# Patient Record
Sex: MALE | Race: WHITE | Employment: FULL TIME | URBAN - NONMETROPOLITAN AREA
[De-identification: names, ages, dates, MRNs, and addresses within clinical notes are randomized per-mention and may not be internally consistent; named-entity substitution may affect disease eponyms.]

---

## 2020-07-08 ENCOUNTER — OFFICE VISIT (OUTPATIENT)
Dept: FAMILY MEDICINE CLINIC | Age: 55
End: 2020-07-08
Payer: COMMERCIAL

## 2020-07-08 VITALS
SYSTOLIC BLOOD PRESSURE: 136 MMHG | WEIGHT: 234.4 LBS | DIASTOLIC BLOOD PRESSURE: 84 MMHG | BODY MASS INDEX: 33.56 KG/M2 | HEART RATE: 78 BPM | HEIGHT: 70 IN | TEMPERATURE: 97.9 F

## 2020-07-08 PROBLEM — N52.8 OTHER MALE ERECTILE DYSFUNCTION: Status: ACTIVE | Noted: 2020-07-08

## 2020-07-08 PROBLEM — I10 ESSENTIAL HYPERTENSION: Status: ACTIVE | Noted: 2020-07-08

## 2020-07-08 PROBLEM — E11.9 TYPE 2 DIABETES MELLITUS WITHOUT COMPLICATION, WITHOUT LONG-TERM CURRENT USE OF INSULIN (HCC): Status: ACTIVE | Noted: 2020-07-08

## 2020-07-08 PROBLEM — R97.20 ELEVATED PSA: Status: ACTIVE | Noted: 2020-07-08

## 2020-07-08 PROBLEM — F41.9 ANXIETY: Status: ACTIVE | Noted: 2020-07-08

## 2020-07-08 PROBLEM — E78.49 OTHER HYPERLIPIDEMIA: Status: ACTIVE | Noted: 2020-07-08

## 2020-07-08 PROBLEM — H61.23 BILATERAL IMPACTED CERUMEN: Status: ACTIVE | Noted: 2020-07-08

## 2020-07-08 PROCEDURE — G0296 VISIT TO DETERM LDCT ELIG: HCPCS | Performed by: FAMILY MEDICINE

## 2020-07-08 PROCEDURE — 99204 OFFICE O/P NEW MOD 45 MIN: CPT | Performed by: FAMILY MEDICINE

## 2020-07-08 RX ORDER — BUPROPION HYDROCHLORIDE 300 MG/1
300 TABLET ORAL EVERY MORNING
Qty: 90 TABLET | Refills: 1 | Status: SHIPPED | OUTPATIENT
Start: 2020-07-08 | End: 2021-01-11 | Stop reason: SDUPTHER

## 2020-07-08 RX ORDER — PITAVASTATIN CALCIUM 4.18 MG/1
4 TABLET, FILM COATED ORAL DAILY
COMMUNITY
End: 2020-07-08 | Stop reason: SDUPTHER

## 2020-07-08 RX ORDER — BUPROPION HYDROCHLORIDE 300 MG/1
300 TABLET ORAL EVERY MORNING
COMMUNITY
End: 2020-07-08 | Stop reason: SDUPTHER

## 2020-07-08 RX ORDER — LISINOPRIL 5 MG/1
5 TABLET ORAL DAILY
Qty: 90 TABLET | Refills: 1 | Status: SHIPPED | OUTPATIENT
Start: 2020-07-08 | End: 2021-01-11 | Stop reason: SDUPTHER

## 2020-07-08 RX ORDER — SILDENAFIL CITRATE 20 MG/1
60 TABLET ORAL DAILY PRN
Qty: 20 TABLET | Refills: 5 | Status: SHIPPED | OUTPATIENT
Start: 2020-07-08 | End: 2021-01-11 | Stop reason: SDUPTHER

## 2020-07-08 RX ORDER — LISINOPRIL 5 MG/1
5 TABLET ORAL DAILY
COMMUNITY
End: 2020-07-08 | Stop reason: SDUPTHER

## 2020-07-08 RX ORDER — PITAVASTATIN CALCIUM 4.18 MG/1
4 TABLET, FILM COATED ORAL DAILY
Qty: 90 TABLET | Refills: 1 | Status: SHIPPED | OUTPATIENT
Start: 2020-07-08 | End: 2021-01-11 | Stop reason: SDUPTHER

## 2020-07-08 RX ORDER — TADALAFIL 10 MG/1
10 TABLET ORAL PRN
Refills: 0 | Status: CANCELLED | OUTPATIENT
Start: 2020-07-08

## 2020-07-08 SDOH — ECONOMIC STABILITY: FOOD INSECURITY: WITHIN THE PAST 12 MONTHS, THE FOOD YOU BOUGHT JUST DIDN'T LAST AND YOU DIDN'T HAVE MONEY TO GET MORE.: NEVER TRUE

## 2020-07-08 SDOH — ECONOMIC STABILITY: FOOD INSECURITY: WITHIN THE PAST 12 MONTHS, YOU WORRIED THAT YOUR FOOD WOULD RUN OUT BEFORE YOU GOT MONEY TO BUY MORE.: NEVER TRUE

## 2020-07-08 SDOH — ECONOMIC STABILITY: TRANSPORTATION INSECURITY
IN THE PAST 12 MONTHS, HAS LACK OF TRANSPORTATION KEPT YOU FROM MEETINGS, WORK, OR FROM GETTING THINGS NEEDED FOR DAILY LIVING?: NO

## 2020-07-08 SDOH — HEALTH STABILITY: MENTAL HEALTH: HOW OFTEN DO YOU HAVE A DRINK CONTAINING ALCOHOL?: NEVER

## 2020-07-08 SDOH — ECONOMIC STABILITY: INCOME INSECURITY: HOW HARD IS IT FOR YOU TO PAY FOR THE VERY BASICS LIKE FOOD, HOUSING, MEDICAL CARE, AND HEATING?: NOT HARD AT ALL

## 2020-07-08 SDOH — ECONOMIC STABILITY: TRANSPORTATION INSECURITY
IN THE PAST 12 MONTHS, HAS THE LACK OF TRANSPORTATION KEPT YOU FROM MEDICAL APPOINTMENTS OR FROM GETTING MEDICATIONS?: NO

## 2020-07-08 ASSESSMENT — ENCOUNTER SYMPTOMS
BLOOD IN STOOL: 0
WHEEZING: 0
SORE THROAT: 0
SHORTNESS OF BREATH: 0
RHINORRHEA: 0
BACK PAIN: 0
BLURRED VISION: 0
VISUAL CHANGE: 0
ABDOMINAL PAIN: 0

## 2020-07-08 ASSESSMENT — PATIENT HEALTH QUESTIONNAIRE - PHQ9
SUM OF ALL RESPONSES TO PHQ9 QUESTIONS 1 & 2: 0
SUM OF ALL RESPONSES TO PHQ QUESTIONS 1-9: 0
1. LITTLE INTEREST OR PLEASURE IN DOING THINGS: 0
2. FEELING DOWN, DEPRESSED OR HOPELESS: 0
SUM OF ALL RESPONSES TO PHQ QUESTIONS 1-9: 0

## 2020-07-08 NOTE — PROGRESS NOTES
[facility] PROFESSIONAL [facility]  [address] [facility]  Dept: [phone]  Dept Fax: [phone]  Loc: [phone]  PROGRESS NOTE      New Patient    Visit Date: 7/8/2020    Chaparro Tanner is a 62 y.o. male who presents today for:  Chief Complaint   Patient presents with    New Patient       Subjective:  Diabetes   He presents for his follow-up diabetic visit. He has type 2 diabetes mellitus. His disease course has been stable. There are no hypoglycemic associated symptoms. Pertinent negatives for hypoglycemia include no dizziness or nervousness/anxiousness. Pertinent negatives for diabetes include no blurred vision, no chest pain, no foot paresthesias, no visual change and no weight loss. There are no hypoglycemic complications. Symptoms are stable. Pertinent negatives for diabetic complications include no CVA, heart disease, nephropathy or peripheral neuropathy. Risk factors for coronary artery disease include dyslipidemia, diabetes mellitus, male sex, obesity, tobacco exposure, sedentary lifestyle and hypertension. Current diabetic treatment includes oral agent (monotherapy). He is compliant with treatment all of the time. His weight is stable. He is following a diabetic diet. An ACE inhibitor/angiotensin II receptor blocker is being taken. Type 2 diabetes: On metformin. He does not check glucose levels. He is also on an ACE inhibitor and statin. HTN:  On linsopril. No hx of CAD    Hyperlipidemia: He is on Livalo. No myalgias. anxiety: On Wellbutrin 300 mg daily. Mood is good. Anxiety is controlled. Sleeping well. He reports his job is stressful    History of elevated PSA. Wakes 2-3 times per night to urinate. Bump on scalp that he attributes to his sinus problem. He was previously seen in [facility] for his primary care. He is due for labs.     Works as a  in [employer]    Review of Systems   Constitutional: Negative route daily       No current facility-administered medications for this visit. Allergies   Allergen Reactions    Penicillins Hives     Health Maintenance   Topic Date Due    Hepatitis C screen  1965    HIV screen  08/03/1978    DTaP/Tdap/Td vaccine (1 - Tdap) 08/03/1982    Lipid screen  08/03/2003    Shingles Vaccine (1 of 2) 08/03/2013    Colon cancer screen colonoscopy  08/03/2013    Low dose CT lung screening  08/03/2018    Flu vaccine (1) 09/01/2020    Hepatitis A vaccine  Aged Out    Hepatitis B vaccine  Aged Out    Hib vaccine  Aged Out    Meningococcal (ACWY) vaccine  Aged Out    Pneumococcal 0-64 years Vaccine  Aged Out       Objective:  /84 (Site: Left Upper Arm, Position: Sitting, Cuff Size: Large Adult)   Pulse 78   Temp 97.9 °F (36.6 °C) (Temporal)   Ht 5' 10\" (1.778 m)   Wt 234 lb 6.4 oz (106.3 kg)   BMI 33.63 kg/m²   Physical Exam  Vitals signs reviewed. Constitutional:       General: He is not in acute distress. Appearance: He is well-developed. He is not ill-appearing. HENT:      Head:        Right Ear: There is impacted cerumen. Left Ear: There is impacted cerumen. Mouth/Throat:      Mouth: Mucous membranes are moist.      Pharynx: No oropharyngeal exudate or posterior oropharyngeal erythema. Eyes:      General: No scleral icterus. Right eye: No discharge. Left eye: No discharge. Conjunctiva/sclera: Conjunctivae normal.   Cardiovascular:      Rate and Rhythm: Normal rate and regular rhythm. Heart sounds: No murmur. Pulmonary:      Effort: Pulmonary effort is normal. No respiratory distress. Breath sounds: Normal breath sounds. No wheezing. Abdominal:      General: There is no distension. Palpations: Abdomen is soft. Tenderness: There is no abdominal tenderness. Musculoskeletal:      Right lower leg: No edema. Left lower leg: No edema.    Neurological:      Mental Status: He is alert and oriented to person, place, and time. Mental status is at baseline. Gait: Gait normal.   Psychiatric:         Mood and Affect: Mood normal.         Behavior: Behavior normal.         Visual inspection:  Deformity/amputation: absent  Skin lesions/pre-ulcerative calluses: absent  Edema: right- negative, left- negative    Sensory exam:  Monofilament sensation: normal  (minimum of 5 random plantar locations tested, avoiding callused areas - > 1 area with absence of sensation is + for neuropathy)    Plus at least one of the following:  Pulses: normal,     Impression/Plan:  1. Type 2 diabetes mellitus without complication, without long-term current use of insulin (HCC)  Chronic. Check status of control with A1c. Continue metformin. Check labs. Recommend starting aspirin 81 mg daily for cardiac prevention  - metFORMIN (GLUCOPHAGE) 500 MG tablet; Take 1 tablet by mouth 2 times daily (with meals)  Dispense: 180 tablet; Refill: 1  - Comprehensive Metabolic Panel; Future  - CBC; Future  - Hemoglobin A1C; Future  - HM DIABETES FOOT EXAM    2. Essential hypertension  Chronic. Well-controlled. Continue/refill lisinopril. Check labs. - lisinopril (PRINIVIL;ZESTRIL) 5 MG tablet; Take 1 tablet by mouth daily  Dispense: 90 tablet; Refill: 1  - Comprehensive Metabolic Panel; Future  - CBC; Future    3. Other hyperlipidemia  Chronic. Check status of control with lipid panel. Refill med  - pitavastatin (LIVALO) 4 MG TABS tablet; Take 1 tablet by mouth daily  Dispense: 90 tablet; Refill: 1  - Lipid Panel; Future    4. Other male erectile dysfunction  Chronic. Controlled with Revatio. Refill med  - sildenafil (REVATIO) 20 MG tablet; Take 3 tablets by mouth daily as needed (erectile dysfunction)  Dispense: 20 tablet; Refill: 5    5. Anxiety  Well-controlled. Chronic condition. Refill medication(s). - buPROPion (WELLBUTRIN XL) 300 MG extended release tablet; Take 1 tablet by mouth every morning  Dispense: 90 tablet;  Refill: 1    6. Elevated PSA  Chronic. PSA 1.95 in February 2019. He has not had a recheck since then. Recheck PSA. Discussed his urinary symptoms that he has at nighttime. He declines Flomax for suspected BPH.  - PSA Prostatic Specific Antigen; Future    7. Bilateral impacted cerumen  New problem. He declines irrigation today. Recommend Debrox    8. Personal history of tobacco use  - NV VISIT TO DISCUSS LUNG CA SCREEN W LDCT  - CT Lung Screen (Annual); Future      Diet and exercise    They voiced understanding. All questions answered. They agreed with treatment plan. See patient instructions for any educational materials that may have been given. Discussed use, benefit, and side effects of prescribed medications. Reviewed health maintenance. (Please note that portions of this note may have been completed with a voice recognition program.  Efforts were made to edit the dictation but occasionally words are mis-transcribed.)    Return in about 6 months (around 1/8/2021) for HTN, DM. Electronically signed by Simone Jasso MD on 7/8/2020 at 8:18 AM     Low Dose CT (LDCT) Lung Screening criteria met   Age 50-69   Pack year smoking >30   Still smoking or less than 15 year since quit   No sign or symptoms of lung cancer   > 11 months since last LDCT     Risks and benefits of lung cancer screening with LDCT scans discussed:    Significance of positive screen - False-positive LDCT results often occur. 95% of all positive results do not lead to a diagnosis of cancer. Usually further imaging can resolve most false-positive results; however, some patients may require invasive procedures. Over diagnosis risk - 10% to 12% of screen-detected lung cancer cases are over diagnosed--that is, the cancer would not have been detected in the patient's lifetime without the screening.     Need for follow up screens annually to continue lung cancer screening effectiveness     Risks associated with radiation from annual

## 2020-10-09 ENCOUNTER — TELEPHONE (OUTPATIENT)
Dept: FAMILY MEDICINE CLINIC | Age: 55
End: 2020-10-09

## 2020-10-09 NOTE — TELEPHONE ENCOUNTER
Approval through Cover My Meds for Livalo.  [facility] notified of approval. Message left for patient notifying him of approval.

## 2020-10-09 NOTE — TELEPHONE ENCOUNTER
Called [facility] and spoke with Maximus Landa. The Lisinopril is covered by his insurance and does not require anything further to be done. They have it ready for him to . Maximus Landa then stated that the Livolo 4 mg for cholesterol is not covered. She gave me information so I can attempt PA through Cover My Meds.

## 2021-01-06 ENCOUNTER — TELEPHONE (OUTPATIENT)
Dept: FAMILY MEDICINE CLINIC | Age: 56
End: 2021-01-06

## 2021-01-06 NOTE — TELEPHONE ENCOUNTER
Call to patient to reschedule appointment on 1/7/2020 to virtual or another day. Patient will to reschedule appointment to Monday 1/11/2021 at [address] [place] [address]. No questions or concerns voiced.

## 2021-01-11 ENCOUNTER — OFFICE VISIT (OUTPATIENT)
Dept: FAMILY MEDICINE CLINIC | Age: 56
End: 2021-01-11
Payer: COMMERCIAL

## 2021-01-11 VITALS
WEIGHT: 222 LBS | HEIGHT: 70 IN | OXYGEN SATURATION: 98 % | TEMPERATURE: 98.3 F | HEART RATE: 82 BPM | SYSTOLIC BLOOD PRESSURE: 136 MMHG | BODY MASS INDEX: 31.78 KG/M2 | DIASTOLIC BLOOD PRESSURE: 84 MMHG

## 2021-01-11 DIAGNOSIS — I10 ESSENTIAL HYPERTENSION: ICD-10-CM

## 2021-01-11 DIAGNOSIS — F41.9 ANXIETY: ICD-10-CM

## 2021-01-11 DIAGNOSIS — E78.49 OTHER HYPERLIPIDEMIA: ICD-10-CM

## 2021-01-11 DIAGNOSIS — E11.9 TYPE 2 DIABETES MELLITUS WITHOUT COMPLICATION, WITHOUT LONG-TERM CURRENT USE OF INSULIN (HCC): Primary | ICD-10-CM

## 2021-01-11 DIAGNOSIS — N52.8 OTHER MALE ERECTILE DYSFUNCTION: ICD-10-CM

## 2021-01-11 DIAGNOSIS — R20.0 RIGHT UPPER EXTREMITY NUMBNESS: ICD-10-CM

## 2021-01-11 PROCEDURE — 99214 OFFICE O/P EST MOD 30 MIN: CPT | Performed by: FAMILY MEDICINE

## 2021-01-11 RX ORDER — PANTOPRAZOLE SODIUM 40 MG/1
40 TABLET, DELAYED RELEASE ORAL DAILY
COMMUNITY
Start: 2020-02-10 | End: 2021-10-21 | Stop reason: SDUPTHER

## 2021-01-11 RX ORDER — SILDENAFIL CITRATE 20 MG/1
60 TABLET ORAL DAILY PRN
Qty: 20 TABLET | Refills: 5 | Status: SHIPPED | OUTPATIENT
Start: 2021-01-11 | End: 2021-07-06

## 2021-01-11 RX ORDER — BUPROPION HYDROCHLORIDE 300 MG/1
300 TABLET ORAL EVERY MORNING
Qty: 90 TABLET | Refills: 1 | Status: SHIPPED | OUTPATIENT
Start: 2021-01-11 | End: 2021-09-01 | Stop reason: SDUPTHER

## 2021-01-11 RX ORDER — LISINOPRIL 5 MG/1
5 TABLET ORAL DAILY
Qty: 90 TABLET | Refills: 1 | Status: SHIPPED | OUTPATIENT
Start: 2021-01-11 | End: 2021-10-21 | Stop reason: SDUPTHER

## 2021-01-11 RX ORDER — PITAVASTATIN CALCIUM 4.18 MG/1
4 TABLET, FILM COATED ORAL DAILY
Qty: 90 TABLET | Refills: 1 | Status: SHIPPED | OUTPATIENT
Start: 2021-01-11 | End: 2021-10-21 | Stop reason: SDUPTHER

## 2021-01-11 ASSESSMENT — ENCOUNTER SYMPTOMS
WHEEZING: 0
SHORTNESS OF BREATH: 0

## 2021-01-11 ASSESSMENT — PATIENT HEALTH QUESTIONNAIRE - PHQ9
1. LITTLE INTEREST OR PLEASURE IN DOING THINGS: 0
SUM OF ALL RESPONSES TO PHQ QUESTIONS 1-9: 0
SUM OF ALL RESPONSES TO PHQ QUESTIONS 1-9: 0

## 2021-01-11 NOTE — PROGRESS NOTES
Problem Relation Age of Onset    Lung Cancer Mother     Lung Cancer Sister     Cancer Brother         throat     Social History     Tobacco Use    Smoking status: Former Smoker     Packs/day: 1.50     Years: 30.00     Pack years: 45.00     Types: Cigarettes     Quit date:      Years since quittin.0    Smokeless tobacco: Never Used   Substance Use Topics    Alcohol use: Not Currently     Frequency: Never      Current Outpatient Medications   Medication Sig Dispense Refill    pantoprazole (PROTONIX) 40 MG tablet Take 40 mg by mouth daily      pitavastatin (LIVALO) 4 MG TABS tablet Take 1 tablet by mouth daily 90 tablet 1    lisinopril (PRINIVIL;ZESTRIL) 5 MG tablet Take 1 tablet by mouth daily 90 tablet 1    buPROPion (WELLBUTRIN XL) 300 MG extended release tablet Take 1 tablet by mouth every morning 90 tablet 1    metFORMIN (GLUCOPHAGE) 500 MG tablet Take 1 tablet by mouth 2 times daily (with meals) 180 tablet 1    fluticasone (VERAMYST) 27.5 MCG/SPRAY nasal spray 2 sprays by Each Nostril route daily      sildenafil (REVATIO) 20 MG tablet Take 3 tablets by mouth daily as needed (erectile dysfunction) 20 tablet 5     No current facility-administered medications for this visit.       Allergies   Allergen Reactions    Penicillins Hives     Health Maintenance   Topic Date Due    Potassium monitoring  1963    Creatinine monitoring  1963    Hepatitis C screen  1965    Pneumococcal 0-64 years Vaccine (1 of 1 - PPSV23) 1969    A1C test (Diabetic or Prediabetic)  1973    Diabetic retinal exam  1973    Lipid screen  1973    HIV screen  1978    Diabetic microalbuminuria test  1981    Hepatitis B vaccine (1 of 3 - Risk 3-dose series) 1982    DTaP/Tdap/Td vaccine (1 - Tdap) 1982    Shingles Vaccine (1 of 2) 2013    Low dose CT lung screening  2018    Flu vaccine (1) 2021 (Originally 2020)    Diabetic foot exam  07/08/2021    Colon cancer screen colonoscopy  09/05/2029    Hepatitis A vaccine  Aged Out    Hib vaccine  Aged Out    Meningococcal (ACWY) vaccine  Aged Out       Objective:  /84 (Site: Left Upper Arm, Position: Sitting)   Pulse 82   Temp 98.3 °F (36.8 °C)   Ht 5' 10\" (1.778 m)   Wt 222 lb (100.7 kg)   SpO2 98%   BMI 31.85 kg/m²   Physical Exam  Vitals signs reviewed. Constitutional:       General: He is not in acute distress. Appearance: He is well-developed. He is not ill-appearing. Cardiovascular:      Rate and Rhythm: Normal rate and regular rhythm. Heart sounds: No murmur. Pulmonary:      Effort: Pulmonary effort is normal. No respiratory distress. Breath sounds: Normal breath sounds. No wheezing. Musculoskeletal:      Right lower leg: No edema. Left lower leg: No edema. Neurological:      Mental Status: He is alert. Mental status is at baseline. Psychiatric:         Mood and Affect: Mood normal.         Behavior: Behavior normal.       Right wrist: Normal range of motion of the wrist.  No tenderness. Negative Tinel's sign at the wrist.  Negative Phalen test.    Right elbow: Negative tap test median nerve    Normal range of motion of the neck    Impression/Plan:  1. Type 2 diabetes mellitus without complication, without long-term current use of insulin (HCC)  Chronic. Unclear status of control as he needs to get labs done that were ordered in July. Refill Metformin  - metFORMIN (GLUCOPHAGE) 500 MG tablet; Take 1 tablet by mouth 2 times daily (with meals)  Dispense: 180 tablet; Refill: 1    2. Essential hypertension  Chronic. Controlled. Refill med  - lisinopril (PRINIVIL;ZESTRIL) 5 MG tablet; Take 1 tablet by mouth daily  Dispense: 90 tablet; Refill: 1    3. Other hyperlipidemia  Chronic. Unclear clear status of control as he has not got the labs done that were ordered in July. Refill med  - pitavastatin (LIVALO) 4 MG TABS tablet;  Take 1 tablet by mouth daily  Dispense: 90 tablet; Refill: 1    4. Anxiety  Well-controlled. Chronic condition. Refill medication.  - buPROPion (WELLBUTRIN XL) 300 MG extended release tablet; Take 1 tablet by mouth every morning  Dispense: 90 tablet; Refill: 1    5. Other male erectile dysfunction  med refill  - sildenafil (REVATIO) 20 MG tablet; Take 3 tablets by mouth daily as needed (erectile dysfunction)  Dispense: 20 tablet; Refill: 5    6. Right upper extremity numbness  New problem. Unclear etiology. Possible radial nerve neuropathy. Recommend further testing which he declined. Continue to monitor. return for worsening symptoms      They voiced understanding. All questions answered. They agreed with treatment plan. See patient instructions for any educational materials that may have been given. Discussed use, benefit, and side effects of prescribed medications. Reviewed health maintenance. (Please note that portions of this note may have been completed with a voice recognition program.  Efforts were made to edit the dictation but occasionally words are mis-transcribed.)    Return in about 6 months (around 7/11/2021) for Well.       Electronically signed by Delta Bishop MD on 1/11/2021 at 9:03 AM

## 2021-07-05 DIAGNOSIS — N52.8 OTHER MALE ERECTILE DYSFUNCTION: ICD-10-CM

## 2021-07-06 RX ORDER — SILDENAFIL CITRATE 20 MG/1
60 TABLET ORAL DAILY PRN
Qty: 60 TABLET | Refills: 1 | Status: SHIPPED | OUTPATIENT
Start: 2021-07-06

## 2021-07-23 ENCOUNTER — TELEPHONE (OUTPATIENT)
Dept: FAMILY MEDICINE CLINIC | Age: 56
End: 2021-07-23

## 2021-08-03 ENCOUNTER — TELEPHONE (OUTPATIENT)
Dept: FAMILY MEDICINE CLINIC | Age: 56
End: 2021-08-03

## 2021-09-01 DIAGNOSIS — F41.9 ANXIETY: ICD-10-CM

## 2021-09-01 RX ORDER — BUPROPION HYDROCHLORIDE 300 MG/1
300 TABLET ORAL EVERY MORNING
Qty: 30 TABLET | Refills: 0 | Status: SHIPPED | OUTPATIENT
Start: 2021-09-01 | End: 2021-10-13 | Stop reason: SDUPTHER

## 2021-09-01 NOTE — TELEPHONE ENCOUNTER
----- Message from Shelli Peoples sent at 9/1/2021  3:22 PM EDT -----  Subject: Refill Request    QUESTIONS  Name of Medication? buPROPion (WELLBUTRIN XL) 300 MG extended release   tablet  Patient-reported dosage and instructions? 300MG 1x daily  How many days do you have left? 3  [id number]  Pharmacy phone number (if available)? [phone]  ---------------------------------------------------------------------------  --------------  CALL BACK INFO  What is the best way for the office to contact you? OK to leave message on   voicemail  Preferred Call Back Phone Number?  [phone]

## 2021-09-01 NOTE — TELEPHONE ENCOUNTER
Uma Smart called requesting a refill on the following medications:  Requested Prescriptions     Pending Prescriptions Disp Refills    buPROPion (WELLBUTRIN XL) 300 MG extended release tablet 90 tablet 1     Sig: Take 1 tablet by mouth every morning       Date of last visit: 1/11/2021  Date of next visit (if applicable):Visit date not found  Date of last refill: 01/11/21  Pharmacy Name: Waldo Pena LPN

## 2021-10-08 DIAGNOSIS — E11.9 TYPE 2 DIABETES MELLITUS WITHOUT COMPLICATION, WITHOUT LONG-TERM CURRENT USE OF INSULIN (HCC): ICD-10-CM

## 2021-10-08 NOTE — TELEPHONE ENCOUNTER
Nadeem Lynne called requesting a refill on the following medications:  Requested Prescriptions     Pending Prescriptions Disp Refills    metFORMIN (GLUCOPHAGE) 500 MG tablet [Pharmacy Med Name: METFORMIN  MG TABLET] 60 tablet 5     Sig: TAKE 1 TABLET BY MOUTH TWICE A DAY WITH MEALS       Date of last visit: 1/11/2021  Date of next visit (if applicable):10/21/2021  Date of last refill: 01/11/21  Pharmacy Name: Marcela Pena LPN

## 2021-10-13 ENCOUNTER — TELEPHONE (OUTPATIENT)
Dept: FAMILY MEDICINE CLINIC | Age: 56
End: 2021-10-13

## 2021-10-13 DIAGNOSIS — F41.9 ANXIETY: ICD-10-CM

## 2021-10-13 RX ORDER — BUPROPION HYDROCHLORIDE 300 MG/1
300 TABLET ORAL EVERY MORNING
Qty: 30 TABLET | Refills: 0 | Status: SHIPPED | OUTPATIENT
Start: 2021-10-13 | End: 2021-10-21 | Stop reason: SDUPTHER

## 2021-10-13 NOTE — TELEPHONE ENCOUNTER
Marylin Rosenthal called in requesting a refill on bupropion (Wellburtrin XL) 300 mg. He would like it called into [facility] in [facility]. He does have an appointment scheduled next week.

## 2021-10-21 ENCOUNTER — OFFICE VISIT (OUTPATIENT)
Dept: FAMILY MEDICINE CLINIC | Age: 56
End: 2021-10-21
Payer: COMMERCIAL

## 2021-10-21 VITALS
TEMPERATURE: 97.2 F | HEART RATE: 78 BPM | RESPIRATION RATE: 17 BRPM | HEIGHT: 70 IN | WEIGHT: 230.6 LBS | DIASTOLIC BLOOD PRESSURE: 86 MMHG | BODY MASS INDEX: 33.01 KG/M2 | OXYGEN SATURATION: 98 % | SYSTOLIC BLOOD PRESSURE: 152 MMHG

## 2021-10-21 DIAGNOSIS — I10 ESSENTIAL HYPERTENSION: ICD-10-CM

## 2021-10-21 DIAGNOSIS — K21.9 GASTROESOPHAGEAL REFLUX DISEASE WITHOUT ESOPHAGITIS: ICD-10-CM

## 2021-10-21 DIAGNOSIS — E78.49 OTHER HYPERLIPIDEMIA: ICD-10-CM

## 2021-10-21 DIAGNOSIS — L21.9 SEBORRHEIC DERMATITIS: ICD-10-CM

## 2021-10-21 DIAGNOSIS — Z12.5 PROSTATE CANCER SCREENING: ICD-10-CM

## 2021-10-21 DIAGNOSIS — F41.9 ANXIETY: ICD-10-CM

## 2021-10-21 DIAGNOSIS — E11.9 TYPE 2 DIABETES MELLITUS WITHOUT COMPLICATION, WITHOUT LONG-TERM CURRENT USE OF INSULIN (HCC): Primary | ICD-10-CM

## 2021-10-21 PROCEDURE — 99214 OFFICE O/P EST MOD 30 MIN: CPT | Performed by: FAMILY MEDICINE

## 2021-10-21 RX ORDER — MOMETASONE FUROATE 1 MG/G
CREAM TOPICAL
Qty: 1 EACH | Refills: 1 | Status: SHIPPED | OUTPATIENT
Start: 2021-10-21

## 2021-10-21 RX ORDER — BUPROPION HYDROCHLORIDE 300 MG/1
300 TABLET ORAL EVERY MORNING
Qty: 90 TABLET | Refills: 1 | Status: SHIPPED | OUTPATIENT
Start: 2021-10-21 | End: 2022-05-13

## 2021-10-21 RX ORDER — PANTOPRAZOLE SODIUM 40 MG/1
40 TABLET, DELAYED RELEASE ORAL DAILY
Qty: 90 TABLET | Refills: 1 | Status: SHIPPED | OUTPATIENT
Start: 2021-10-21

## 2021-10-21 RX ORDER — PITAVASTATIN CALCIUM 4.18 MG/1
4 TABLET, FILM COATED ORAL DAILY
Qty: 90 TABLET | Refills: 1 | Status: SHIPPED | OUTPATIENT
Start: 2021-10-21 | End: 2021-10-28

## 2021-10-21 RX ORDER — LISINOPRIL 10 MG/1
10 TABLET ORAL DAILY
Qty: 90 TABLET | Refills: 1 | Status: SHIPPED | OUTPATIENT
Start: 2021-10-21 | End: 2022-06-17 | Stop reason: SDUPTHER

## 2021-10-21 SDOH — ECONOMIC STABILITY: FOOD INSECURITY: WITHIN THE PAST 12 MONTHS, THE FOOD YOU BOUGHT JUST DIDN'T LAST AND YOU DIDN'T HAVE MONEY TO GET MORE.: NEVER TRUE

## 2021-10-21 SDOH — ECONOMIC STABILITY: FOOD INSECURITY: WITHIN THE PAST 12 MONTHS, YOU WORRIED THAT YOUR FOOD WOULD RUN OUT BEFORE YOU GOT MONEY TO BUY MORE.: NEVER TRUE

## 2021-10-21 ASSESSMENT — ENCOUNTER SYMPTOMS
WHEEZING: 0
SHORTNESS OF BREATH: 0

## 2021-10-21 ASSESSMENT — SOCIAL DETERMINANTS OF HEALTH (SDOH): HOW HARD IS IT FOR YOU TO PAY FOR THE VERY BASICS LIKE FOOD, HOUSING, MEDICAL CARE, AND HEATING?: NOT HARD AT ALL

## 2021-10-21 NOTE — PROGRESS NOTES
[facility] PROFESSIONAL [facility]  [address] [facility]  Dept: [phone]  Dept Fax: [phone]  Loc: [phone]  PROGRESS NOTE      Visit Date: 10/21/2021    Darcie Alcantara is a 60 y.o. male who presents today for:  Chief Complaint   Patient presents with    Check-Up    Ear Problem     Skin around ear is cracked, peeling, dry up into scalp as well       Subjective:  HPI    9-month follow-up    Type 2 diabetes: On metformin. He does not check glucose levels. He is also on an ACE inhibitor and statin. No recent A1c.     HTN:  On linsopril but did not take it this morning. Does not check BP at home. No hx of CAD.      Hyperlipidemia: He is on Livalo. No myalgias.      anxiety: On Wellbutrin 300 mg daily. Mood is good. Sleeping well. He reports his job is stressful and he is working a lot.     Takes protonix prn for GERD    History of elevated PSA. Wakes 2 times per night to urinate. He has not had labs done for over 1 year. Review of Systems   Constitutional: Negative for chills and fever. Respiratory: Negative for shortness of breath and wheezing. Cardiovascular: Negative for chest pain and leg swelling. Neurological: Negative for dizziness, syncope and numbness. Psychiatric/Behavioral: Negative for sleep disturbance.      Patient Active Problem List   Diagnosis    Type 2 diabetes mellitus without complication, without long-term current use of insulin ([address]    Essential hypertension    Other hyperlipidemia    Other male erectile dysfunction    Anxiety    Elevated PSA    Bilateral impacted cerumen     Past Medical History:   Diagnosis Date    Hyperlipidemia     Hypertension     Type 2 diabetes mellitus without complication ([address]       Past Surgical History:   Procedure Laterality Date    COLONOSCOPY       Family History   Problem Relation Age of Onset    Lung Cancer Mother     Lung Cancer Sister     Cancer Brother         throat     Social History     Tobacco Use    Smoking status: Former Smoker     Packs/day: 1.50     Years: 30.00     Pack years: 45.00     Types: Cigarettes     Quit date:      Years since quittin.8    Smokeless tobacco: Never Used   Substance Use Topics    Alcohol use: Not Currently      Current Outpatient Medications   Medication Sig Dispense Refill    buPROPion (WELLBUTRIN XL) 300 MG extended release tablet Take 1 tablet by mouth every morning 30 tablet 0    metFORMIN (GLUCOPHAGE) 500 MG tablet TAKE 1 TABLET BY MOUTH TWICE A DAY WITH MEALS 60 tablet 5    sildenafil (REVATIO) 20 MG tablet TAKE 3 TABLETS BY MOUTH DAILY AS NEEDED (ERECTILE DYSFUNCTION) 60 tablet 1    pantoprazole (PROTONIX) 40 MG tablet Take 40 mg by mouth daily      pitavastatin (LIVALO) 4 MG TABS tablet Take 1 tablet by mouth daily 90 tablet 1    lisinopril (PRINIVIL;ZESTRIL) 5 MG tablet Take 1 tablet by mouth daily 90 tablet 1    fluticasone (VERAMYST) 27.5 MCG/SPRAY nasal spray 2 sprays by Each Nostril route daily       No current facility-administered medications for this visit.      Allergies   Allergen Reactions    Penicillins Hives     Health Maintenance   Topic Date Due    Potassium monitoring  Never done    Creatinine monitoring  Never done    Hepatitis C screen  Never done    Pneumococcal 0-64 years Vaccine (1 of 2 - PPSV23) Never done    A1C test (Diabetic or Prediabetic)  Never done    Diabetic retinal exam  Never done    Lipid screen  Never done    COVID-19 Vaccine (1) Never done    HIV screen  Never done    Diabetic microalbuminuria test  Never done    Hepatitis B vaccine (1 of 3 - Risk 3-dose series) Never done    DTaP/Tdap/Td vaccine (1 - Tdap) Never done    Shingles Vaccine (1 of 2) Never done    Low dose CT lung screening  Never done    Diabetic foot exam  2021    Flu vaccine (1) Never done    Colon cancer screen colonoscopy  2029    Hepatitis A vaccine  Aged C/ [place] 19 Hib vaccine  Aged Out    Meningococcal (ACWY) vaccine  Aged Out       Objective:  BP (!) 152/86 (Site: Left Upper Arm, Position: Sitting, Cuff Size: Medium Adult)   Pulse 78   Temp 97.2 °F (36.2 °C) (Temporal)   Resp 17   Ht 5' 10\" (1.778 m)   Wt 230 lb 9.6 oz (104.6 kg)   SpO2 98%   BMI 33.09 kg/m²   Physical Exam  Vitals reviewed. Constitutional:       General: He is not in acute distress. Appearance: He is well-developed. He is not ill-appearing. Cardiovascular:      Rate and Rhythm: Normal rate and regular rhythm. Heart sounds: No murmur heard. Pulmonary:      Effort: Pulmonary effort is normal. No respiratory distress. Breath sounds: Normal breath sounds. No wheezing. Musculoskeletal:      Right lower leg: No edema. Left lower leg: No edema. Neurological:      Mental Status: He is alert. Mental status is at baseline. Psychiatric:         Mood and Affect: Mood normal.         Behavior: Behavior normal.       Dried scaley around both ears. Visual inspection:  Deformity/amputation: absent  Skin lesions/pre-ulcerative calluses: absent  Edema: right- negative, left- negative    Sensory exam:  Monofilament sensation: normal  (minimum of 5 random plantar locations tested, avoiding callused areas - > 1 area with absence of sensation is + for neuropathy)    Plus at least one of the following:  Pulses: normal,       Impression/Plan:  1. Type 2 diabetes mellitus without complication, without long-term current use of insulin (HCC)  Chronic. Check status of control with A1c. Continue Metformin.  - Hemoglobin A1C; Future  - CBC; Future  - Comprehensive Metabolic Panel; Future  -  DIABETES FOOT EXAM    2. Essential hypertension  Chronic. Uncontrolled. Increase lisinopril to 10 mg. Check labs  - CBC; Future  - Comprehensive Metabolic Panel; Future  - lisinopril (PRINIVIL;ZESTRIL) 10 MG tablet; Take 1 tablet by mouth daily  Dispense: 90 tablet; Refill: 1    3.  Other hyperlipidemia  Chronic. Check status of control with lipid panel. Refill Livalo  - Lipid Panel; Future  - pitavastatin (LIVALO) 4 MG TABS tablet; Take 1 tablet by mouth daily  Dispense: 90 tablet; Refill: 1    4. Anxiety  Chronic. Controlled. Refill meds  - buPROPion (WELLBUTRIN XL) 300 MG extended release tablet; Take 1 tablet by mouth every morning  Dispense: 90 tablet; Refill: 1    5. Gastroesophageal reflux disease without esophagitis  Chronic. Controlled. Refill meds  - pantoprazole (PROTONIX) 40 MG tablet; Take 1 tablet by mouth daily  Dispense: 90 tablet; Refill: 1    6. Seborrheic dermatitis  Chronic problem. Uncontrolled. Start Elocon  - mometasone (ELOCON) 0.1 % cream; Apply topically daily. Dispense: 1 each; Refill: 1    7. Prostate cancer screening  - PSA Screening; Future    Flu vaccine to be done at work. Recommend shingles vaccine  Recommend covid vaccine    They voiced understanding. All questions answered. They agreed with treatment plan. See patient instructions for any educational materials that may have been given. Discussed use, benefit, and side effects of prescribed medications. Reviewed health maintenance. (Please note that portions of this note may have been completed with a voice recognition program.  Efforts were made to edit the dictation but occasionally words are mis-transcribed.)    Return in about 6 months (around 4/21/2022) for DM, HTN.       Electronically signed by Rodolfo Baker MD on 10/21/2021 at 1:26 PM

## 2021-10-26 ENCOUNTER — TELEPHONE (OUTPATIENT)
Dept: FAMILY MEDICINE CLINIC | Age: 56
End: 2021-10-26

## 2021-10-26 DIAGNOSIS — E78.49 OTHER HYPERLIPIDEMIA: Primary | ICD-10-CM

## 2021-10-27 NOTE — TELEPHONE ENCOUNTER
Jb Franklin returns call and he is willing to start on Crestor. He uses [facility]

## 2021-10-28 RX ORDER — ROSUVASTATIN CALCIUM 20 MG/1
20 TABLET, COATED ORAL DAILY
Qty: 90 TABLET | Refills: 1 | Status: SHIPPED | OUTPATIENT
Start: 2021-10-28 | End: 2022-05-13

## 2022-05-13 DIAGNOSIS — E78.49 OTHER HYPERLIPIDEMIA: ICD-10-CM

## 2022-05-13 DIAGNOSIS — F41.9 ANXIETY: ICD-10-CM

## 2022-05-13 RX ORDER — ROSUVASTATIN CALCIUM 20 MG/1
TABLET, COATED ORAL
Qty: 30 TABLET | Refills: 0 | Status: SHIPPED | OUTPATIENT
Start: 2022-05-13 | End: 2022-06-17 | Stop reason: SDUPTHER

## 2022-05-13 RX ORDER — BUPROPION HYDROCHLORIDE 300 MG/1
TABLET ORAL
Qty: 30 TABLET | Refills: 0 | Status: SHIPPED | OUTPATIENT
Start: 2022-05-13

## 2022-05-13 NOTE — TELEPHONE ENCOUNTER
Wayne Mensah is requesting a refill on the following medications:  Requested Prescriptions     Pending Prescriptions Disp Refills    buPROPion (WELLBUTRIN XL) 300 MG extended release tablet [Pharmacy Med Name: BUPROPION HCL  MG TABLET] 30 tablet 5     Sig: TAKE 1 TABLET BY MOUTH EVERY DAY IN THE MORNING    rosuvastatin (CRESTOR) 20 MG tablet [Pharmacy Med Name: ROSUVASTATIN CALCIUM 20 MG TAB] 30 tablet 5     Sig: TAKE 1 TABLET BY MOUTH EVERY DAY       Date of last visit: 10/21/2021  Date of next visit (if applicable):Visit date not found  Date of last refill: 10/21/2021  Pharmacy Name: [facility] Thanks,  Ryan Courtney, FERMIN

## 2022-05-15 DIAGNOSIS — I10 ESSENTIAL HYPERTENSION: ICD-10-CM

## 2022-05-16 NOTE — TELEPHONE ENCOUNTER
Joyce Hernandez is requesting a refill on the following medications:  Requested Prescriptions     Pending Prescriptions Disp Refills    lisinopril (PRINIVIL;ZESTRIL) 5 MG tablet [Pharmacy Med Name: LISINOPRIL 5 MG TABLET] 30 tablet 5     Sig: TAKE 1 TABLET BY MOUTH EVERY DAY       Date of last visit: 10/21/2021  Date of next visit (if applicable):Visit date not found  Date of last refill: 10/21/2021  Pharmacy Name: Porter Sesay,  Madeline Greene LPN

## 2022-05-16 NOTE — TELEPHONE ENCOUNTER
Refill is for 5 mg of lisinopril. His medication list has 10 mg dose listed. He is also due for 6-month follow-up appt for diabetes and hypertension    Please advise patient.   Wesley Oneal MD

## 2022-05-18 NOTE — TELEPHONE ENCOUNTER
Left message for Paul Magaña to call and schedule appointment and to clarify what dosage of Lisinopril he is taking.

## 2022-05-20 RX ORDER — LISINOPRIL 5 MG/1
TABLET ORAL
Qty: 30 TABLET | Refills: 0 | OUTPATIENT
Start: 2022-05-20

## 2022-06-08 DIAGNOSIS — I10 ESSENTIAL HYPERTENSION: ICD-10-CM

## 2022-06-08 DIAGNOSIS — E78.49 OTHER HYPERLIPIDEMIA: ICD-10-CM

## 2022-06-08 RX ORDER — ROSUVASTATIN CALCIUM 20 MG/1
TABLET, COATED ORAL
Qty: 30 TABLET | Refills: 0 | OUTPATIENT
Start: 2022-06-08

## 2022-06-08 RX ORDER — LISINOPRIL 5 MG/1
TABLET ORAL
Qty: 30 TABLET | Refills: 5 | OUTPATIENT
Start: 2022-06-08

## 2022-06-08 RX ORDER — LISINOPRIL 10 MG/1
TABLET ORAL
Qty: 30 TABLET | Refills: 5 | OUTPATIENT
Start: 2022-06-08

## 2022-06-17 ENCOUNTER — TELEPHONE (OUTPATIENT)
Dept: FAMILY MEDICINE CLINIC | Age: 57
End: 2022-06-17

## 2022-06-17 ENCOUNTER — OFFICE VISIT (OUTPATIENT)
Dept: FAMILY MEDICINE CLINIC | Age: 57
End: 2022-06-17
Payer: COMMERCIAL

## 2022-06-17 VITALS
TEMPERATURE: 97.3 F | SYSTOLIC BLOOD PRESSURE: 132 MMHG | BODY MASS INDEX: 31.39 KG/M2 | HEIGHT: 71 IN | HEART RATE: 78 BPM | OXYGEN SATURATION: 98 % | WEIGHT: 224.2 LBS | RESPIRATION RATE: 16 BRPM | DIASTOLIC BLOOD PRESSURE: 76 MMHG

## 2022-06-17 DIAGNOSIS — K21.9 GASTROESOPHAGEAL REFLUX DISEASE WITHOUT ESOPHAGITIS: ICD-10-CM

## 2022-06-17 DIAGNOSIS — E78.49 OTHER HYPERLIPIDEMIA: ICD-10-CM

## 2022-06-17 DIAGNOSIS — E11.9 TYPE 2 DIABETES MELLITUS WITHOUT COMPLICATION, WITHOUT LONG-TERM CURRENT USE OF INSULIN (HCC): Primary | ICD-10-CM

## 2022-06-17 DIAGNOSIS — J30.1 NON-SEASONAL ALLERGIC RHINITIS DUE TO POLLEN: ICD-10-CM

## 2022-06-17 DIAGNOSIS — I10 ESSENTIAL HYPERTENSION: ICD-10-CM

## 2022-06-17 DIAGNOSIS — I10 ESSENTIAL HYPERTENSION: Primary | ICD-10-CM

## 2022-06-17 PROCEDURE — 99214 OFFICE O/P EST MOD 30 MIN: CPT | Performed by: FAMILY MEDICINE

## 2022-06-17 RX ORDER — ROSUVASTATIN CALCIUM 20 MG/1
20 TABLET, COATED ORAL DAILY
Qty: 90 TABLET | Refills: 1 | Status: SHIPPED | OUTPATIENT
Start: 2022-06-17

## 2022-06-17 RX ORDER — LISINOPRIL 10 MG/1
10 TABLET ORAL DAILY
Qty: 90 TABLET | Refills: 1 | Status: SHIPPED | OUTPATIENT
Start: 2022-06-17

## 2022-06-17 ASSESSMENT — ENCOUNTER SYMPTOMS
WHEEZING: 0
RHINORRHEA: 1
SHORTNESS OF BREATH: 0
SINUS COMPLAINT: 1
COUGH: 1

## 2022-06-17 ASSESSMENT — PATIENT HEALTH QUESTIONNAIRE - PHQ9
SUM OF ALL RESPONSES TO PHQ QUESTIONS 1-9: 0
SUM OF ALL RESPONSES TO PHQ9 QUESTIONS 1 & 2: 0
SUM OF ALL RESPONSES TO PHQ QUESTIONS 1-9: 0
1. LITTLE INTEREST OR PLEASURE IN DOING THINGS: 0
2. FEELING DOWN, DEPRESSED OR HOPELESS: 0
SUM OF ALL RESPONSES TO PHQ QUESTIONS 1-9: 0
SUM OF ALL RESPONSES TO PHQ QUESTIONS 1-9: 0

## 2022-06-17 NOTE — PROGRESS NOTES
[facility] PROFESSIONAL [facility]  [address] [place] [address]  Dept: [phone]  Dept Fax: [phone]  Loc: [phone]  PROGRESS NOTE      Visit Date: 6/17/2022    Dillon Falcon is a 60 y.o. male who presents today for:  Chief Complaint   Patient presents with    6 Month Follow-Up    Diabetes    Sinus Problem       Subjective:  Diabetes  Pertinent negatives for hypoglycemia include no dizziness. Pertinent negatives for diabetes include no chest pain. Sinus Problem  Associated symptoms include congestion and coughing. Pertinent negatives include no chills or shortness of breath. 8-month follow-up    Type 2 diabetes: On metformin. He does not check glucose levels. He is also on an ACE inhibitor and statin. No recent A1c.     HTN:  On lisinopril. Does not check BP at home. No hx of CAD.      Hyperlipidemia: He is on crestor. No myalgias.      anxiety: On Wellbutrin 100 mg XL daily. Mood is good. Sleeping well.  ses psychiatry in [address] [place] [address] protonix prn for GERD    History of elevated PSA. Wakes 2 times per night to urinate. He has not had labs done for over 2 years. New problem:  Sinus problem to chest to face. Started 2-3 months ago. Intermittent. No known allergies. 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 He wants a note to not wear an N95 given he has HTN and works in a hot kitchen.  he is not vaccinated (for [employer] exemption) so employer requires N95 vs face mask. Review of Systems   Constitutional: Negative for chills and fever. HENT: Positive for congestion and rhinorrhea. Respiratory: Positive for cough. Negative for shortness of breath and wheezing. Cardiovascular: Negative for chest pain and leg swelling. Neurological: Negative for dizziness, syncope and numbness. Psychiatric/Behavioral: Negative for sleep disturbance.      Patient Active Problem List Diagnosis    Type 2 diabetes mellitus without complication, without long-term current use of insulin (HCC)    Essential hypertension    Other hyperlipidemia    Other male erectile dysfunction    Anxiety    Elevated PSA    Bilateral impacted cerumen     Past Medical History:   Diagnosis Date    Hyperlipidemia     Hypertension     Type 2 diabetes mellitus without complication (HCC)       Past Surgical History:   Procedure Laterality Date    COLONOSCOPY       Family History   Problem Relation Age of Onset    Lung Cancer Mother     Lung Cancer Sister     Cancer Brother         throat     Social History     Tobacco Use    Smoking status: Former Smoker     Packs/day: 1.50     Years: 30.00     Pack years: 45.00     Types: Cigarettes     Quit date: 2007     Years since quitting: 15.4    Smokeless tobacco: Never Used   Substance Use Topics    Alcohol use: Not Currently      Current Outpatient Medications   Medication Sig Dispense Refill    buPROPion (WELLBUTRIN XL) 300 MG extended release tablet TAKE 1 TABLET BY MOUTH EVERY DAY IN THE MORNING 30 tablet 0    rosuvastatin (CRESTOR) 20 MG tablet TAKE 1 TABLET BY MOUTH EVERY DAY 30 tablet 0    pantoprazole (PROTONIX) 40 MG tablet Take 1 tablet by mouth daily 90 tablet 1    lisinopril (PRINIVIL;ZESTRIL) 10 MG tablet Take 1 tablet by mouth daily 90 tablet 1    mometasone (ELOCON) 0.1 % cream Apply topically daily. 1 each 1    metFORMIN (GLUCOPHAGE) 500 MG tablet TAKE 1 TABLET BY MOUTH TWICE A DAY WITH MEALS 60 tablet 5    sildenafil (REVATIO) 20 MG tablet TAKE 3 TABLETS BY MOUTH DAILY AS NEEDED (ERECTILE DYSFUNCTION) 60 tablet 1    fluticasone (VERAMYST) 27.5 MCG/SPRAY nasal spray 2 sprays by Each Nostril route daily       No current facility-administered medications for this visit.      Allergies   Allergen Reactions    Penicillins Hives     Health Maintenance   Topic Date Due    COVID-19 Vaccine (1) Never done    Pneumococcal 0-64 years Vaccine (1 - PCV) Never done    A1C test (Diabetic or Prediabetic)  Never done    Lipids  Never done    HIV screen  Never done    Diabetic microalbuminuria test  Never done    Diabetic retinal exam  Never done    Hepatitis C screen  Never done    Hepatitis B vaccine (1 of 3 - Risk 3-dose series) Never done    DTaP/Tdap/Td vaccine (1 - Tdap) Never done    Prostate Specific Antigen (PSA) Screening or Monitoring  Never done    Shingles vaccine (1 of 2) Never done    Depression Screen  01/11/2022    Flu vaccine (Season Ended) 09/01/2022    Diabetic foot exam  10/21/2022    Colorectal Cancer Screen  09/05/2029    Hepatitis A vaccine  Aged Out    Hib vaccine  Aged Out    Meningococcal (ACWY) vaccine  Aged Out       Objective:  /76 (Site: Left Upper Arm, Position: Sitting)   Pulse 78   Temp 97.3 °F (36.3 °C)   Resp 16   Ht 5' 10.5\" (1.791 m)   Wt 224 lb 3.2 oz (101.7 kg)   SpO2 98%   BMI 31.71 kg/m²   Physical Exam  Vitals reviewed. Constitutional:       General: He is not in acute distress. Appearance: He is well-developed. He is not ill-appearing. HENT:      Nose: No congestion or rhinorrhea. Mouth/Throat:      Mouth: Mucous membranes are moist.      Pharynx: No oropharyngeal exudate. Cardiovascular:      Rate and Rhythm: Normal rate and regular rhythm. Heart sounds: No murmur heard. Pulmonary:      Effort: Pulmonary effort is normal. No respiratory distress. Breath sounds: Normal breath sounds. No wheezing. Musculoskeletal:      Right lower leg: No edema. Left lower leg: No edema. Neurological:      Mental Status: He is alert. Mental status is at baseline. Psychiatric:         Mood and Affect: Mood normal.         Behavior: Behavior normal.           Impression/Plan:  1. Type 2 diabetes mellitus without complication, without long-term current use of insulin (HCC)  Chronic. Uncertain status of control as no recent A1c as patient has not done labs.   Continue metformin. Diet and physical activity  - metFORMIN (GLUCOPHAGE) 500 MG tablet; Take 1 tablet by mouth 2 times daily (with meals)  Dispense: 180 tablet; Refill: 1    2. Essential hypertension  Chronic. Controlled. Refill med  - lisinopril (PRINIVIL;ZESTRIL) 10 MG tablet; Take 1 tablet by mouth daily  Dispense: 90 tablet; Refill: 1    3. Other hyperlipidemia  Chronic. Uncertain status of control as no recent labs. Refill Crestor  - rosuvastatin (CRESTOR) 20 MG tablet; Take 1 tablet by mouth daily  Dispense: 90 tablet; Refill: 1    4. Gastroesophageal reflux disease without esophagitis  Chronic. Controlled. Continue Protonix    5. Non-seasonal allergic rhinitis due to pollen  Chronic. Continue Flonase. Recommend Zyrtec OTC    Discussed that I am not comfortable providing him with letter stating he does not need to wear an N95. Recommend he do the labs ordered in October 2021      They voiced understanding. All questions answered. They agreed with treatment plan. See patient instructions for any educational materials that may have been given. Discussed use, benefit, and side effects of prescribed medications. Reviewed health maintenance. (Please note that portions of this note may have been completed with a voice recognition program.  Efforts were made to edit the dictation but occasionally words are mis-transcribed.)    Return in about 6 months (around 12/17/2022) for DM.       Electronically signed by Leslie Marquez MD on 6/17/2022 at 9:36 AM

## 2022-06-18 NOTE — TELEPHONE ENCOUNTER
Patient called stating lisinopril was not sent to [facility] pharmacy as requested. He has one pill. In Epic, medication is noted as sent and received by pharrmacy. I called pharmacy. They report glitch in system. This was fixed. Called patient back, voice mail left as no answer. He can  script.

## 2022-12-30 ENCOUNTER — TELEPHONE (OUTPATIENT)
Dept: FAMILY MEDICINE CLINIC | Age: 57
End: 2022-12-30

## 2022-12-30 DIAGNOSIS — E11.9 TYPE 2 DIABETES MELLITUS WITHOUT COMPLICATION, WITHOUT LONG-TERM CURRENT USE OF INSULIN (HCC): Primary | ICD-10-CM

## 2022-12-30 DIAGNOSIS — Z12.5 PROSTATE CANCER SCREENING: ICD-10-CM

## 2022-12-30 NOTE — TELEPHONE ENCOUNTER
Patient called in wanting to know if you could order blood work for him to check his diabetes, he will be getting it done at the [facility] so it'll need sent over there. It looks like the last orders of blood work have .

## 2022-12-30 NOTE — TELEPHONE ENCOUNTER
Labs ordered. Please fax lab orders to patient's preference for blood draw site  Recommend appointment for 6-month follow-up diabetes.  Please schedule

## 2022-12-30 NOTE — TELEPHONE ENCOUNTER
Patient informed, appt.  Scheduled, and lab orders sent to [facility] at [phone] per patient's request.

## 2022-12-31 LAB
ALBUMIN SERPL-MCNC: 4.6 G/DL (ref 3.5–5)
ALBUMIN/GLOBULIN RATIO: 1.5 (ref 1.2–1.5)
ALK PHOSPHATASE: 72 U/L (ref 38–126)
ALT SERPL-CCNC: 27 U/L (ref 10–40)
AST SERPL-CCNC: 23 U/L (ref 10–42)
BASOPHILS # BLD: 0.9 % (ref 0–3)
BILIRUB SERPL-MCNC: 0.9 MG/DL (ref 0.3–1.2)
BUN BLDV-MCNC: 18 MG/DL (ref 7–22)
CALCIUM SERPL-MCNC: 9.7 MG/DL (ref 8.4–10.2)
CHLORIDE BLD-SCNC: 105 MEQ/L (ref 98–107)
CHOLESTEROL: 192 MG/DL (ref 0–200)
CO2: 22 MEQ/L (ref 22–31)
CREAT SERPL-MCNC: 0.9 MG/DL (ref 0.4–1.1)
EOSINOPHIL # BLD: 2.6 % (ref 0–4)
GFR SERPL CREATININE-BSD FRML MDRD: >=60 ML/MIN/{1.73_M2}
GLOBULIN: 3 G/DL (ref 2.9–3.3)
GLUCOSE: 117 MG/DL (ref 70–126)
HCT VFR BLD CALC: 50 % (ref 42–52)
HDLC SERPL-MCNC: 38 MG/DL (ref 40–60)
HEMOGLOBIN: 17 G/DL (ref 14–18)
LDL CHOLESTEROL: 117 MG/DL (ref 0–100)
LYMPHOCYTES # BLD: 26.7 % (ref 17.6–49.6)
MCH RBC QN AUTO: 29.2 PG (ref 28–32)
MCHC RBC AUTO-ENTMCNC: 34.1 G/DL (ref 33–37)
MCV RBC AUTO: 85.6 FL (ref 80–94)
MONOCYTES # BLD: 6.6 % (ref 4.1–12.4)
NON-HDL CHOLESTEROL: 154 MG/DL (ref 0–130)
PDW BLD-RTO: 13.9 % (ref 11.5–14.5)
PLATELET # BLD: 273 THOU/CUMM (ref 130–400)
POTASSIUM SERPL-SCNC: 4.9 MEQ/L (ref 3.5–5.1)
RBC: 5.84 MIL/CUMM (ref 4.7–6.1)
SCAN OF BLOOD SMEAR: NO
SEG NEUTROPHILS: 63.2 % (ref 39.4–72.5)
SODIUM BLD-SCNC: 135 MEQ/L (ref 136–145)
TOTAL PROTEIN: 7.6 G/DL (ref 6.4–8.3)
TRIGL SERPL-MCNC: 185 MG/DL (ref 40–150)
WBC: 7.4 THOU/CUMM (ref 4.8–10.8)

## 2023-01-01 LAB
AVERAGE GLUCOSE: 132 MG/DL (ref 70–126)
HBA1C MFR BLD: 6.4 % (ref 4.4–6.4)
PROSTATE SPECIFIC ANTIGEN: 2.55 NG/ML (ref 0–1)

## 2023-01-03 DIAGNOSIS — I10 ESSENTIAL HYPERTENSION: ICD-10-CM

## 2023-01-03 RX ORDER — LISINOPRIL 10 MG/1
TABLET ORAL
Qty: 30 TABLET | Refills: 5 | OUTPATIENT
Start: 2023-01-03

## 2023-01-03 NOTE — TELEPHONE ENCOUNTER
----- Message from Ruel Ziegler MD sent at 1/2/2023  8:56 AM EST -----  Mildly elevated psa:  recheck in about 12 months. A1c shows diabetes is well controlled. CMP is good. Lipids show  and gaol is less than 70. CBC is good. Please advise patient.   Ruel Ziegler MD

## 2023-01-03 NOTE — PROGRESS NOTES
Patient informed of lab results and verbalized understanding.  Patient reports will see us in the office on 1/19/23 at 330pm.

## 2023-01-03 NOTE — TELEPHONE ENCOUNTER
----- Message from Guido Romero MD sent at 1/2/2023  8:56 AM EST -----  Mildly elevated psa:  recheck in about 12 months. A1c shows diabetes is well controlled. CMP is good.  Lipids show  and gaol is less than 70.  CBC is good. Please advise patient.  Guido Romero MD

## 2023-01-24 ENCOUNTER — OFFICE VISIT (OUTPATIENT)
Dept: FAMILY MEDICINE CLINIC | Age: 58
End: 2023-01-24
Payer: COMMERCIAL

## 2023-01-24 VITALS
OXYGEN SATURATION: 96 % | TEMPERATURE: 97.8 F | WEIGHT: 225.2 LBS | DIASTOLIC BLOOD PRESSURE: 81 MMHG | BODY MASS INDEX: 31.53 KG/M2 | RESPIRATION RATE: 16 BRPM | SYSTOLIC BLOOD PRESSURE: 129 MMHG | HEIGHT: 71 IN | HEART RATE: 79 BPM

## 2023-01-24 DIAGNOSIS — E78.49 OTHER HYPERLIPIDEMIA: ICD-10-CM

## 2023-01-24 DIAGNOSIS — E11.9 TYPE 2 DIABETES MELLITUS WITHOUT COMPLICATION, WITHOUT LONG-TERM CURRENT USE OF INSULIN (HCC): Primary | ICD-10-CM

## 2023-01-24 DIAGNOSIS — I10 ESSENTIAL HYPERTENSION: ICD-10-CM

## 2023-01-24 LAB
CREATININE URINE POCT: 200
MICROALBUMIN/CREAT 24H UR: 10 MG/G{CREAT}
MICROALBUMIN/CREAT UR-RTO: <30

## 2023-01-24 PROCEDURE — 82044 UR ALBUMIN SEMIQUANTITATIVE: CPT | Performed by: FAMILY MEDICINE

## 2023-01-24 PROCEDURE — 99214 OFFICE O/P EST MOD 30 MIN: CPT | Performed by: FAMILY MEDICINE

## 2023-01-24 PROCEDURE — 3074F SYST BP LT 130 MM HG: CPT | Performed by: FAMILY MEDICINE

## 2023-01-24 PROCEDURE — 3079F DIAST BP 80-89 MM HG: CPT | Performed by: FAMILY MEDICINE

## 2023-01-24 RX ORDER — ROSUVASTATIN CALCIUM 20 MG/1
20 TABLET, COATED ORAL DAILY
Qty: 90 TABLET | Refills: 1 | Status: SHIPPED | OUTPATIENT
Start: 2023-01-24

## 2023-01-24 RX ORDER — EZETIMIBE 10 MG/1
10 TABLET ORAL DAILY
Qty: 90 TABLET | Refills: 1 | Status: SHIPPED | OUTPATIENT
Start: 2023-01-24

## 2023-01-24 RX ORDER — LISINOPRIL 10 MG/1
10 TABLET ORAL DAILY
Qty: 90 TABLET | Refills: 1 | Status: SHIPPED | OUTPATIENT
Start: 2023-01-24

## 2023-01-24 SDOH — ECONOMIC STABILITY: FOOD INSECURITY: WITHIN THE PAST 12 MONTHS, YOU WORRIED THAT YOUR FOOD WOULD RUN OUT BEFORE YOU GOT MONEY TO BUY MORE.: NEVER TRUE

## 2023-01-24 SDOH — ECONOMIC STABILITY: FOOD INSECURITY: WITHIN THE PAST 12 MONTHS, THE FOOD YOU BOUGHT JUST DIDN'T LAST AND YOU DIDN'T HAVE MONEY TO GET MORE.: NEVER TRUE

## 2023-01-24 ASSESSMENT — ENCOUNTER SYMPTOMS
WHEEZING: 0
SHORTNESS OF BREATH: 0
COUGH: 0

## 2023-01-24 ASSESSMENT — PATIENT HEALTH QUESTIONNAIRE - PHQ9
SUM OF ALL RESPONSES TO PHQ QUESTIONS 1-9: 0
1. LITTLE INTEREST OR PLEASURE IN DOING THINGS: 0
SUM OF ALL RESPONSES TO PHQ QUESTIONS 1-9: 0
SUM OF ALL RESPONSES TO PHQ9 QUESTIONS 1 & 2: 0
2. FEELING DOWN, DEPRESSED OR HOPELESS: 0

## 2023-01-24 ASSESSMENT — SOCIAL DETERMINANTS OF HEALTH (SDOH): HOW HARD IS IT FOR YOU TO PAY FOR THE VERY BASICS LIKE FOOD, HOUSING, MEDICAL CARE, AND HEATING?: NOT HARD AT ALL

## 2023-01-24 NOTE — PROGRESS NOTES
[facility] PROFESSIONAL [facility] MEDICINE  [address]  [facility] [address]  Dept: [phone]  Dept Fax: [phone]  Loc: [phone]  PROGRESS NOTE      Visit Date: 1/24/2023    Ariel Zapien is a 57 y.o. male who presents today for:  Chief Complaint   Patient presents with    Diabetes     No issues/concerns       Subjective:  Diabetes  Pertinent negatives for hypoglycemia include no dizziness. Pertinent negatives for diabetes include no chest pain.     7-month follow-up    Type 2 diabetes: On metformin. He does not check glucose levels.  He is also on an ACE inhibitor and statin. A1c 6.4% in dec.  Works as a  but no other exercise     HTN:  On lisinopril.  Does not check BP at home.  No hx of CAD.      Hyperlipidemia: He is on crestor.  No myalgias.  in dec.       History of elevated PSA.  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      Review of Systems   Constitutional:  Negative for chills and fever.   Respiratory:  Negative for cough, shortness of breath and wheezing.    Cardiovascular:  Negative for chest pain and leg swelling.   Neurological:  Negative for dizziness, syncope and numbness.   Psychiatric/Behavioral:  Negative for sleep disturbance.    Patient Active Problem List   Diagnosis    Type 2 diabetes mellitus without complication, without long-term current use of insulin (HCC)    Essential hypertension    Other hyperlipidemia    Other male erectile dysfunction    Anxiety    Elevated PSA    Bilateral impacted cerumen     Past Medical History:   Diagnosis Date    Hyperlipidemia     Hypertension     Type 2 diabetes mellitus without complication (HCC)       Past Surgical History:   Procedure Laterality Date    COLONOSCOPY       Family History   Problem Relation Age of Onset    Lung Cancer Mother     Lung Cancer Sister     Cancer Brother         throat     Social History     Tobacco Use    Smoking  status: Former     Packs/day: 1.50     Years: 30.00     Pack years: 45.00     Types: Cigarettes     Quit date:      Years since quittin.0    Smokeless tobacco: Never   Substance Use Topics    Alcohol use: Not Currently      Current Outpatient Medications   Medication Sig Dispense Refill    rosuvastatin (CRESTOR) 20 MG tablet Take 1 tablet by mouth daily 90 tablet 1    lisinopril (PRINIVIL;ZESTRIL) 10 MG tablet Take 1 tablet by mouth daily 90 tablet 1    metFORMIN (GLUCOPHAGE) 500 MG tablet Take 1 tablet by mouth 2 times daily (with meals) 180 tablet 1    mometasone (ELOCON) 0.1 % cream Apply topically daily. 1 each 1    sildenafil (REVATIO) 20 MG tablet TAKE 3 TABLETS BY MOUTH DAILY AS NEEDED (ERECTILE DYSFUNCTION) 60 tablet 1    fluticasone (VERAMYST) 27.5 MCG/SPRAY nasal spray 2 sprays by Each Nostril route daily      buPROPion (WELLBUTRIN XL) 300 MG extended release tablet TAKE 1 TABLET BY MOUTH EVERY DAY IN THE MORNING (Patient not taking: Reported on 2023) 30 tablet 0    pantoprazole (PROTONIX) 40 MG tablet Take 1 tablet by mouth daily (Patient not taking: Reported on 2023) 90 tablet 1     No current facility-administered medications for this visit.      Allergies   Allergen Reactions    Penicillins Hives     Health Maintenance   Topic Date Due    COVID-19 Vaccine (1) Never done    Pneumococcal 0-64 years Vaccine (1 - PCV) Never done    HIV screen  Never done    Diabetic Alb to Cr ratio (uACR) test  Never done    Diabetic retinal exam  Never done    Hepatitis C screen  Never done    Hepatitis B vaccine (1 of 3 - Risk 3-dose series) Never done    DTaP/Tdap/Td vaccine (1 - Tdap) Never done    Shingles vaccine (1 of 2) Never done    Flu vaccine (1) Never done    Diabetic foot exam  10/21/2022    Depression Screen  2023    A1C test (Diabetic or Prediabetic)  2023    Lipids  2023    GFR test (Diabetes, CKD 3-4, OR last GFR 15-59)  2023    Colorectal Cancer Screen  2029 Hepatitis A vaccine  Aged Out    Hib vaccine  Aged Out    Meningococcal (ACWY) vaccine  Aged Out       Objective:  /81   Pulse 79   Temp 97.8 °F (36.6 °C)   Resp 16   Ht 5' 10.5\" (1.791 m)   Wt 225 lb 3.2 oz (102.2 kg)   SpO2 96%   BMI 31.86 kg/m²   Physical Exam  Vitals reviewed. Constitutional:       General: He is not in acute distress. Appearance: He is well-developed. He is not ill-appearing. Cardiovascular:      Rate and Rhythm: Normal rate and regular rhythm. Heart sounds: No murmur heard. Pulmonary:      Effort: Pulmonary effort is normal. No respiratory distress. Breath sounds: Normal breath sounds. No wheezing. Musculoskeletal:      Right lower leg: No edema. Left lower leg: No edema. Neurological:      Mental Status: He is alert. Mental status is at baseline. Psychiatric:         Mood and Affect: Mood normal.         Behavior: Behavior normal.         Impression/Plan:  1. Type 2 diabetes mellitus without complication, without long-term current use of insulin ([facility])  Chronic. Well-controlled. Continue metformin. Diet and physical activity. Check labs in 6 months  -  DIABETES FOOT EXAM  - POCT microalbumin  - metFORMIN (GLUCOPHAGE) 500 MG tablet; Take 1 tablet by mouth 2 times daily (with meals)  Dispense: 180 tablet; Refill: 1  - Hemoglobin A1C; Future  - Comprehensive Metabolic Panel; Future  - CBC; Future  - Lipid Panel; Future    2. Other hyperlipidemia  Chronic. Not controlled as goal LDL is 70 or less. Add Zetia. Continue Crestor  - rosuvastatin (CRESTOR) 20 MG tablet; Take 1 tablet by mouth daily  Dispense: 90 tablet; Refill: 1  - ezetimibe (ZETIA) 10 MG tablet; Take 1 tablet by mouth daily  Dispense: 90 tablet; Refill: 1    3. Essential hypertension  Well-controlled. Chronic condition. Refill medication.  - lisinopril (PRINIVIL;ZESTRIL) 10 MG tablet; Take 1 tablet by mouth daily  Dispense: 90 tablet; Refill: 1        They voiced understanding. All questions answered. They agreed with treatment plan. See patient instructions for any educational materials that may have been given. Discussed use, benefit, and side effects of prescribed medications. Reviewed health maintenance. (Please note that portions of this note may have been completed with a voice recognition program.  Efforts were made to edit the dictation but occasionally words are mis-transcribed.)    Return in about 6 months (around 7/24/2023) for DM.       Electronically signed by Radha Ramos MD on 1/24/2023 at 2:42 PM

## 2023-08-04 DIAGNOSIS — E11.9 TYPE 2 DIABETES MELLITUS WITHOUT COMPLICATION, WITHOUT LONG-TERM CURRENT USE OF INSULIN (HCC): ICD-10-CM

## 2023-08-04 DIAGNOSIS — E78.49 OTHER HYPERLIPIDEMIA: ICD-10-CM

## 2023-08-04 DIAGNOSIS — I10 ESSENTIAL HYPERTENSION: ICD-10-CM

## 2023-08-04 RX ORDER — EZETIMIBE 10 MG/1
TABLET ORAL
Qty: 30 TABLET | Refills: 5 | OUTPATIENT
Start: 2023-08-04

## 2023-08-04 RX ORDER — ROSUVASTATIN CALCIUM 20 MG/1
TABLET, COATED ORAL
Qty: 30 TABLET | Refills: 5 | OUTPATIENT
Start: 2023-08-04

## 2023-08-04 RX ORDER — LISINOPRIL 10 MG/1
TABLET ORAL
Qty: 30 TABLET | Refills: 5 | OUTPATIENT
Start: 2023-08-04

## 2023-08-07 DIAGNOSIS — E78.49 OTHER HYPERLIPIDEMIA: ICD-10-CM

## 2023-08-07 DIAGNOSIS — E11.9 TYPE 2 DIABETES MELLITUS WITHOUT COMPLICATION, WITHOUT LONG-TERM CURRENT USE OF INSULIN (HCC): ICD-10-CM

## 2023-08-07 DIAGNOSIS — I10 ESSENTIAL HYPERTENSION: ICD-10-CM

## 2023-08-07 RX ORDER — EZETIMIBE 10 MG/1
10 TABLET ORAL DAILY
Qty: 90 TABLET | Refills: 0 | Status: SHIPPED | OUTPATIENT
Start: 2023-08-07 | End: 2023-08-14 | Stop reason: SDUPTHER

## 2023-08-07 RX ORDER — LISINOPRIL 10 MG/1
10 TABLET ORAL DAILY
Qty: 90 TABLET | Refills: 0 | Status: SHIPPED | OUTPATIENT
Start: 2023-08-07 | End: 2023-08-14 | Stop reason: SDUPTHER

## 2023-08-07 RX ORDER — ROSUVASTATIN CALCIUM 20 MG/1
20 TABLET, COATED ORAL DAILY
Qty: 90 TABLET | Refills: 0 | Status: SHIPPED | OUTPATIENT
Start: 2023-08-07 | End: 2023-08-14 | Stop reason: SDUPTHER

## 2023-08-07 NOTE — TELEPHONE ENCOUNTER
----- Message from Jad Ramos sent at 8/7/2023  8:12 AM EDT -----  Subject: Refill Request    QUESTIONS  Name of Medication? rosuvastatin (CRESTOR) 20 MG tablet  Patient-reported dosage and instructions? one a day  How many days do you have left? 3  Preferred Pharmacy? [id number]  Pharmacy phone number (if available)? [phone]  ---------------------------------------------------------------------------  --------------,  Name of Medication? lisinopril (PRINIVIL;ZESTRIL) 10 MG tablet  Patient-reported dosage and instructions? one a day  How many days do you have left? 3  Preferred Pharmacy? [id number]  Pharmacy phone number (if available)? [phone]  ---------------------------------------------------------------------------  --------------,  Name of Medication? metFORMIN (GLUCOPHAGE) 500 MG tablet  Patient-reported dosage and instructions? two a day  How many days do you have left? 3  Preferred Pharmacy? [id number]  Pharmacy phone number (if available)? [phone]  ---------------------------------------------------------------------------  --------------,  Name of Medication? ezetimibe (ZETIA) 10 MG tablet  Patient-reported dosage and instructions? one a day  How many days do you have left? 3  Preferred Pharmacy? [id number]  Pharmacy phone number (if available)? [phone]  ---------------------------------------------------------------------------  --------------  CALL BACK INFO  What is the best way for the office to contact you? OK to leave message on   voicemail  Preferred Call Back Phone Number? [phone]  ---------------------------------------------------------------------------  --------------  SCRIPT ANSWERS  Relationship to Patient?  Self

## 2023-08-07 NOTE — TELEPHONE ENCOUNTER
Kala He called requesting a refill on the following medications:  Requested Prescriptions     Pending Prescriptions Disp Refills    rosuvastatin (CRESTOR) 20 MG tablet 90 tablet 1     Sig: Take 1 tablet by mouth daily    lisinopril (PRINIVIL;ZESTRIL) 10 MG tablet 90 tablet 1     Sig: Take 1 tablet by mouth daily    metFORMIN (GLUCOPHAGE) 500 MG tablet 180 tablet 1     Sig: Take 1 tablet by mouth 2 times daily (with meals)    ezetimibe (ZETIA) 10 MG tablet 90 tablet 1     Sig: Take 1 tablet by mouth daily       Date of last visit: 1/24/2023  Date of next visit (if applicable):Visit date not found  Date of last refill: 1/24/2023  Pharmacy Name: [facility],  Sarah Kelly LPN

## 2023-08-14 ENCOUNTER — OFFICE VISIT (OUTPATIENT)
Dept: FAMILY MEDICINE CLINIC | Age: 58
End: 2023-08-14
Payer: COMMERCIAL

## 2023-08-14 VITALS
RESPIRATION RATE: 18 BRPM | DIASTOLIC BLOOD PRESSURE: 80 MMHG | SYSTOLIC BLOOD PRESSURE: 132 MMHG | HEART RATE: 80 BPM | BODY MASS INDEX: 31.78 KG/M2 | HEIGHT: 71 IN | WEIGHT: 227 LBS

## 2023-08-14 DIAGNOSIS — Z00.00 WELL ADULT EXAM: Primary | ICD-10-CM

## 2023-08-14 DIAGNOSIS — E11.9 TYPE 2 DIABETES MELLITUS WITHOUT COMPLICATION, WITHOUT LONG-TERM CURRENT USE OF INSULIN (HCC): ICD-10-CM

## 2023-08-14 DIAGNOSIS — E78.49 OTHER HYPERLIPIDEMIA: ICD-10-CM

## 2023-08-14 DIAGNOSIS — K21.9 GASTROESOPHAGEAL REFLUX DISEASE WITHOUT ESOPHAGITIS: ICD-10-CM

## 2023-08-14 DIAGNOSIS — I10 ESSENTIAL HYPERTENSION: ICD-10-CM

## 2023-08-14 DIAGNOSIS — L21.9 SEBORRHEIC DERMATITIS: ICD-10-CM

## 2023-08-14 DIAGNOSIS — M62.838 TRAPEZIUS MUSCLE SPASM: ICD-10-CM

## 2023-08-14 PROBLEM — M19.042 PRIMARY OSTEOARTHRITIS OF HANDS, BILATERAL: Status: ACTIVE | Noted: 2019-02-19

## 2023-08-14 PROBLEM — R97.20 ELEVATED PSA: Status: ACTIVE | Noted: 2019-02-19

## 2023-08-14 PROBLEM — N52.8 OTHER MALE ERECTILE DYSFUNCTION: Status: ACTIVE | Noted: 2019-02-19

## 2023-08-14 PROBLEM — E78.2 MIXED HYPERLIPIDEMIA: Status: ACTIVE | Noted: 2019-02-19

## 2023-08-14 PROBLEM — M19.041 PRIMARY OSTEOARTHRITIS OF HANDS, BILATERAL: Status: ACTIVE | Noted: 2019-02-19

## 2023-08-14 PROCEDURE — 3075F SYST BP GE 130 - 139MM HG: CPT | Performed by: NURSE PRACTITIONER

## 2023-08-14 PROCEDURE — 99396 PREV VISIT EST AGE 40-64: CPT | Performed by: NURSE PRACTITIONER

## 2023-08-14 PROCEDURE — 99213 OFFICE O/P EST LOW 20 MIN: CPT | Performed by: NURSE PRACTITIONER

## 2023-08-14 PROCEDURE — 3079F DIAST BP 80-89 MM HG: CPT | Performed by: NURSE PRACTITIONER

## 2023-08-14 RX ORDER — ROSUVASTATIN CALCIUM 20 MG/1
20 TABLET, COATED ORAL DAILY
Qty: 90 TABLET | Refills: 1 | Status: SHIPPED | OUTPATIENT
Start: 2023-08-14 | End: 2024-02-10

## 2023-08-14 RX ORDER — IBUPROFEN 600 MG/1
600 TABLET ORAL 3 TIMES DAILY PRN
Qty: 100 TABLET | Refills: 0 | Status: SHIPPED | OUTPATIENT
Start: 2023-08-14

## 2023-08-14 RX ORDER — LISINOPRIL 10 MG/1
10 TABLET ORAL DAILY
Qty: 90 TABLET | Refills: 1 | Status: SHIPPED | OUTPATIENT
Start: 2023-08-14 | End: 2024-02-10

## 2023-08-14 RX ORDER — MOMETASONE FUROATE 1 MG/G
CREAM TOPICAL
Qty: 1 EACH | Refills: 1 | Status: SHIPPED | OUTPATIENT
Start: 2023-08-14

## 2023-08-14 RX ORDER — PANTOPRAZOLE SODIUM 40 MG/1
40 TABLET, DELAYED RELEASE ORAL DAILY
Qty: 90 TABLET | Refills: 1 | Status: SHIPPED | OUTPATIENT
Start: 2023-08-14

## 2023-08-14 RX ORDER — CYCLOBENZAPRINE HCL 10 MG
10 TABLET ORAL 3 TIMES DAILY PRN
Qty: 30 TABLET | Refills: 0 | Status: SHIPPED | OUTPATIENT
Start: 2023-08-14

## 2023-08-14 RX ORDER — EZETIMIBE 10 MG/1
10 TABLET ORAL DAILY
Qty: 90 TABLET | Refills: 1 | Status: SHIPPED | OUTPATIENT
Start: 2023-08-14 | End: 2024-02-10

## 2023-08-14 SDOH — ECONOMIC STABILITY: FOOD INSECURITY: WITHIN THE PAST 12 MONTHS, THE FOOD YOU BOUGHT JUST DIDN'T LAST AND YOU DIDN'T HAVE MONEY TO GET MORE.: NEVER TRUE

## 2023-08-14 SDOH — ECONOMIC STABILITY: INCOME INSECURITY: HOW HARD IS IT FOR YOU TO PAY FOR THE VERY BASICS LIKE FOOD, HOUSING, MEDICAL CARE, AND HEATING?: NOT HARD AT ALL

## 2023-08-14 SDOH — ECONOMIC STABILITY: HOUSING INSECURITY
IN THE LAST 12 MONTHS, WAS THERE A TIME WHEN YOU DID NOT HAVE A STEADY PLACE TO SLEEP OR SLEPT IN A SHELTER (INCLUDING NOW)?: NO

## 2023-08-14 SDOH — ECONOMIC STABILITY: FOOD INSECURITY: WITHIN THE PAST 12 MONTHS, YOU WORRIED THAT YOUR FOOD WOULD RUN OUT BEFORE YOU GOT MONEY TO BUY MORE.: NEVER TRUE

## 2023-08-14 ASSESSMENT — ENCOUNTER SYMPTOMS
ABDOMINAL PAIN: 1
BLURRED VISION: 0
VISUAL CHANGE: 0

## 2023-12-14 ENCOUNTER — TELEPHONE (OUTPATIENT)
Dept: FAMILY MEDICINE CLINIC | Age: 58
End: 2023-12-14

## 2024-02-11 DIAGNOSIS — K21.9 GASTROESOPHAGEAL REFLUX DISEASE WITHOUT ESOPHAGITIS: ICD-10-CM

## 2024-02-12 RX ORDER — PANTOPRAZOLE SODIUM 40 MG/1
40 TABLET, DELAYED RELEASE ORAL DAILY
Qty: 90 TABLET | Refills: 3 | Status: SHIPPED | OUTPATIENT
Start: 2024-02-12

## 2024-02-12 NOTE — TELEPHONE ENCOUNTER
Ariel Zapien called requesting a refill on the following medications:  Requested Prescriptions     Pending Prescriptions Disp Refills    pantoprazole (PROTONIX) 40 MG tablet [Pharmacy Med Name: Pantoprazole Sodium 40 MG Oral Tablet Delayed Release] 150 tablet 0     Sig: Take 1 tablet by mouth once daily       Date of last visit: 8/14/2023  Date of next visit (if applicable):Visit date not found  Date of last refill: 08/14/23  Pharmacy Name: [facility] Nicole [place], MA

## 2024-03-15 DIAGNOSIS — E78.49 OTHER HYPERLIPIDEMIA: ICD-10-CM

## 2024-03-15 DIAGNOSIS — I10 ESSENTIAL HYPERTENSION: ICD-10-CM

## 2024-03-15 RX ORDER — LISINOPRIL 10 MG/1
10 TABLET ORAL DAILY
Qty: 90 TABLET | Refills: 3 | Status: SHIPPED | OUTPATIENT
Start: 2024-03-15

## 2024-03-15 RX ORDER — EZETIMIBE 10 MG/1
10 TABLET ORAL DAILY
Qty: 90 TABLET | Refills: 3 | Status: SHIPPED | OUTPATIENT
Start: 2024-03-15

## 2024-03-15 RX ORDER — ROSUVASTATIN CALCIUM 20 MG/1
20 TABLET, COATED ORAL DAILY
Qty: 90 TABLET | Refills: 3 | Status: SHIPPED | OUTPATIENT
Start: 2024-03-15

## 2024-03-15 NOTE — TELEPHONE ENCOUNTER
Ariel Zapien called requesting a refill on the following medications:  Requested Prescriptions     Pending Prescriptions Disp Refills    ezetimibe (ZETIA) 10 MG tablet [Pharmacy Med Name: Ezetimibe 10 MG Oral Tablet] 180 tablet 0     Sig: Take 1 tablet by mouth once daily    rosuvastatin (CRESTOR) 20 MG tablet [Pharmacy Med Name: Rosuvastatin Calcium 20 MG Oral Tablet] 180 tablet 0     Sig: Take 1 tablet by mouth once daily    lisinopril (PRINIVIL;ZESTRIL) 10 MG tablet [Pharmacy Med Name: Lisinopril 10 MG Oral Tablet] 180 tablet 0     Sig: Take 1 tablet by mouth once daily       Date of last visit: 8/14/2023  Date of next visit (if applicable):Visit date not found  Date of last refill: 08/14/23  Pharmacy Name: [facility] Nicole [place], [place]